# Patient Record
Sex: FEMALE | NOT HISPANIC OR LATINO | URBAN - METROPOLITAN AREA
[De-identification: names, ages, dates, MRNs, and addresses within clinical notes are randomized per-mention and may not be internally consistent; named-entity substitution may affect disease eponyms.]

---

## 2021-11-09 ENCOUNTER — OFF PREMISE (OUTPATIENT)
Dept: OTHER | Age: 36
End: 2021-11-09

## 2021-11-09 PROCEDURE — 93010 ELECTROCARDIOGRAM REPORT: CPT | Performed by: INTERNAL MEDICINE

## 2022-01-10 ENCOUNTER — OFF PREMISE (OUTPATIENT)
Dept: OTHER | Age: 37
End: 2022-01-10

## 2022-01-10 PROCEDURE — 93010 ELECTROCARDIOGRAM REPORT: CPT | Performed by: INTERNAL MEDICINE

## 2022-02-03 ENCOUNTER — EXTERNAL RECORD - RED (OUTPATIENT)
Dept: OTHER | Age: 37
End: 2022-02-03

## 2022-02-03 ENCOUNTER — OFF PREMISE (OUTPATIENT)
Dept: OTHER | Age: 37
End: 2022-02-03

## 2022-02-03 PROCEDURE — 93308 TTE F-UP OR LMTD: CPT | Performed by: INTERNAL MEDICINE

## 2022-04-25 PROCEDURE — 93010 ELECTROCARDIOGRAM REPORT: CPT | Performed by: INTERNAL MEDICINE

## 2022-10-15 PROCEDURE — 93010 ELECTROCARDIOGRAM REPORT: CPT | Performed by: INTERNAL MEDICINE

## 2022-11-01 ENCOUNTER — OFF PREMISE (OUTPATIENT)
Dept: OTHER | Age: 37
End: 2022-11-01

## 2022-11-01 PROCEDURE — 93010 ELECTROCARDIOGRAM REPORT: CPT | Performed by: INTERNAL MEDICINE

## 2023-12-06 RX ORDER — LEVETIRACETAM 500 MG/1
500 TABLET ORAL
COMMUNITY
Start: 2023-11-01

## 2023-12-06 RX ORDER — MIRTAZAPINE 15 MG/1
1 TABLET, FILM COATED ORAL NIGHTLY
COMMUNITY
Start: 2023-11-29

## 2023-12-06 RX ORDER — INSULIN GLARGINE 100 [IU]/ML
8 INJECTION, SOLUTION SUBCUTANEOUS
COMMUNITY

## 2023-12-06 RX ORDER — LIDOCAINE 50 MG/G
PATCH TOPICAL
COMMUNITY
Start: 2023-11-29

## 2023-12-06 RX ORDER — CALCITRIOL 0.25 UG/1
0.5 CAPSULE, LIQUID FILLED ORAL
COMMUNITY
Start: 2023-09-25

## 2023-12-06 RX ORDER — ERYTHROMYCIN ETHYLSUCCINATE 400 MG/5ML
200 SUSPENSION ORAL
COMMUNITY
Start: 2023-07-14

## 2023-12-06 RX ORDER — CARVEDILOL 25 MG/1
25 TABLET ORAL
COMMUNITY
Start: 2023-11-22

## 2023-12-06 RX ORDER — CLONIDINE 0.1 MG/24H
1 PATCH, EXTENDED RELEASE TRANSDERMAL
COMMUNITY
Start: 2023-09-18

## 2023-12-06 RX ORDER — NORTRIPTYLINE HYDROCHLORIDE 25 MG/1
25 CAPSULE ORAL DAILY
COMMUNITY

## 2023-12-06 RX ORDER — FOLIC ACID 1 MG/1
1 TABLET ORAL
COMMUNITY
Start: 2023-09-08

## 2023-12-06 RX ORDER — ASPIRIN 81 MG/1
81 TABLET ORAL DAILY
COMMUNITY

## 2023-12-06 RX ORDER — HYDRALAZINE HYDROCHLORIDE 25 MG/1
25 TABLET, FILM COATED ORAL
COMMUNITY

## 2023-12-06 RX ORDER — ATORVASTATIN CALCIUM 40 MG/1
40 TABLET, FILM COATED ORAL DAILY
COMMUNITY
Start: 2023-09-25

## 2023-12-06 RX ORDER — ACETAMINOPHEN, CAFFEINE, DIHYDROCODEINE BITARTRATE 320.5; 30; 16 MG/1; MG/1; MG/1
CAPSULE ORAL
COMMUNITY

## 2023-12-06 RX ORDER — BUSPIRONE HYDROCHLORIDE 5 MG/1
5 TABLET ORAL
COMMUNITY
Start: 2023-11-30

## 2023-12-06 RX ORDER — LOPERAMIDE HYDROCHLORIDE 2 MG/1
2 CAPSULE ORAL
COMMUNITY
Start: 2023-11-28

## 2023-12-06 RX ORDER — ONDANSETRON 4 MG/1
4 TABLET, ORALLY DISINTEGRATING ORAL EVERY 8 HOURS PRN
COMMUNITY
Start: 2023-11-29

## 2023-12-06 RX ORDER — HYDRALAZINE HYDROCHLORIDE 100 MG/1
100 TABLET, FILM COATED ORAL
COMMUNITY
Start: 2023-11-29

## 2023-12-06 RX ORDER — BRINZOLAMIDE/BRIMONIDINE TARTRATE 10; 2 MG/ML; MG/ML
SUSPENSION/ DROPS OPHTHALMIC
COMMUNITY
Start: 2023-08-11

## 2023-12-06 RX ORDER — ISOSORBIDE MONONITRATE 30 MG/1
30 TABLET, EXTENDED RELEASE ORAL
COMMUNITY
Start: 2023-11-29

## 2023-12-06 RX ORDER — ALPRAZOLAM 0.5 MG/1
0.5 TABLET ORAL DAILY PRN
COMMUNITY
Start: 2023-11-29

## 2023-12-06 RX ORDER — FUROSEMIDE 40 MG/1
1 TABLET ORAL DAILY
COMMUNITY
Start: 2023-08-16

## 2023-12-06 RX ORDER — ERYTHROMYCIN 5 MG/G
OINTMENT OPHTHALMIC
COMMUNITY
Start: 2023-11-08

## 2023-12-06 ASSESSMENT — COGNITIVE AND FUNCTIONAL STATUS - GENERAL
ARE YOU DEAF OR DO YOU HAVE SERIOUS DIFFICULTY  HEARING: NO
ARE YOU BLIND OR DO YOU HAVE SERIOUS DIFFICULTY SEEING, EVEN WHEN WEARING GLASSES: YES

## 2023-12-06 ASSESSMENT — ACTIVITIES OF DAILY LIVING (ADL)
CONTINENCE: NEEDS ASSISTANCE
FEEDING: NEEDS ASSISTANCE
ADL_BEFORE_ADMISSION: NEEDS/REQUIRES ASSISTANCE
TRANSFERRING: NEEDS ASSISTANCE
DRESSING: NEEDS ASSISTANCE
TOILETING: NEEDS ASSISTANCE
BATHING: NEEDS ASSISTANCE
ADL_SCORE: 6
HISTORY OF FALLING IN THE LAST YEAR (PRIOR TO ADMISSION): NO
MOBILITY_ASSIST_DEVICES: WHEELCHAIR

## 2023-12-07 ENCOUNTER — HOSPITAL ENCOUNTER (OUTPATIENT)
Age: 38
Discharge: HOME | End: 2023-12-07
Attending: OPHTHALMOLOGY | Admitting: OPHTHALMOLOGY

## 2023-12-07 ENCOUNTER — ANESTHESIA (OUTPATIENT)
Dept: SURGERY | Age: 38
End: 2023-12-07

## 2023-12-07 ENCOUNTER — ANESTHESIA EVENT (OUTPATIENT)
Dept: SURGERY | Age: 38
End: 2023-12-07

## 2023-12-07 VITALS
HEIGHT: 63 IN | TEMPERATURE: 96.8 F | BODY MASS INDEX: 33.01 KG/M2 | SYSTOLIC BLOOD PRESSURE: 144 MMHG | DIASTOLIC BLOOD PRESSURE: 63 MMHG | WEIGHT: 186.29 LBS | HEART RATE: 62 BPM | OXYGEN SATURATION: 96 % | RESPIRATION RATE: 16 BRPM

## 2023-12-07 LAB
GLUCOSE BLDC GLUCOMTR-MCNC: 73 MG/DL (ref 70–99)
POTASSIUM SERPL-SCNC: 4.3 MMOL/L (ref 3.4–5.1)

## 2023-12-07 PROCEDURE — 13000006 HB ANESTHESIA MAC S/U + 1ST 15 MIN: Performed by: OPHTHALMOLOGY

## 2023-12-07 PROCEDURE — 10002801 HB RX 250 W/O HCPCS: Performed by: ANESTHESIOLOGY

## 2023-12-07 PROCEDURE — 10002800 HB RX 250 W HCPCS: Performed by: OPHTHALMOLOGY

## 2023-12-07 PROCEDURE — 10004451 HB PACU RECOVERY 1ST 30 MINUTES: Performed by: OPHTHALMOLOGY

## 2023-12-07 PROCEDURE — 10002803 HB RX 637

## 2023-12-07 PROCEDURE — 10002801 HB RX 250 W/O HCPCS: Performed by: OPHTHALMOLOGY

## 2023-12-07 PROCEDURE — C1814 RETINAL TAMP, SILICONE OIL: HCPCS | Performed by: OPHTHALMOLOGY

## 2023-12-07 PROCEDURE — 10002801 HB RX 250 W/O HCPCS

## 2023-12-07 PROCEDURE — 10002807 HB RX 258: Performed by: ANESTHESIOLOGY

## 2023-12-07 PROCEDURE — 10006023 HB SUPPLY 272: Performed by: OPHTHALMOLOGY

## 2023-12-07 PROCEDURE — 10002803 HB RX 637: Performed by: OPHTHALMOLOGY

## 2023-12-07 PROCEDURE — 10004452 HB PACU ADDL 30 MINUTES: Performed by: OPHTHALMOLOGY

## 2023-12-07 PROCEDURE — 10002800 HB RX 250 W HCPCS: Performed by: ANESTHESIOLOGY

## 2023-12-07 PROCEDURE — 10006027 HB SUPPLY 278: Performed by: OPHTHALMOLOGY

## 2023-12-07 PROCEDURE — 13000001 HB PHASE II RECOVERY EA 30 MINUTES: Performed by: OPHTHALMOLOGY

## 2023-12-07 PROCEDURE — 13000038 HB MAJOR COMPLEX CASE S/U + 1ST 15 MIN: Performed by: OPHTHALMOLOGY

## 2023-12-07 PROCEDURE — 13000007 HB ANESTHESIA MAC EA ADD MINUTE: Performed by: OPHTHALMOLOGY

## 2023-12-07 PROCEDURE — A67113 ANES REPR RETINAL DETACHMENT; COMPLEX: Performed by: ANESTHESIOLOGY

## 2023-12-07 PROCEDURE — 82962 GLUCOSE BLOOD TEST: CPT

## 2023-12-07 PROCEDURE — 84132 ASSAY OF SERUM POTASSIUM: CPT | Performed by: ANESTHESIOLOGY

## 2023-12-07 PROCEDURE — 13000039 HB MAJOR COMPLEX CASE EA ADD MINUTE: Performed by: OPHTHALMOLOGY

## 2023-12-07 DEVICE — SILIKON™ 1000 (PURIFIED POLYDIMETHYLSILOXANE) IS HIGHLY PURIFIED LONG CHAIN POLYDIMETHYLSILOXANE TRIMETHYLSILOXY TERMINATED SILICONE OIL. IT IS STERILE,NON-PYROGENIC, CLEAR, COLORLESS AND HAS A VISCOSITY OF 1000 CS. FOR USE AS A POST-OPERATIVE RETINAL TAMPONADE DURING VITREORETINAL SURGERY. SILIKON™ 1000IS COMPOSED OF SILICON, OXYGEN, CARBON AND HYDROGEN ATOMS. SILIKON™ 1000 IS IMMISCIBLE WITH AQUEOUS COMPONENTS AND IS RELATIVELY INERT MATERIAL WITHLITTLE BIOLOGICAL TOXICITY POTENTIAL.
Type: IMPLANTABLE DEVICE | Site: EYE | Status: FUNCTIONAL
Brand: SILIKON™

## 2023-12-07 RX ORDER — SODIUM CHLORIDE, SODIUM LACTATE, POTASSIUM CHLORIDE, CALCIUM CHLORIDE 600; 310; 30; 20 MG/100ML; MG/100ML; MG/100ML; MG/100ML
INJECTION, SOLUTION INTRAVENOUS CONTINUOUS PRN
Status: DISCONTINUED | OUTPATIENT
Start: 2023-12-07 | End: 2023-12-07

## 2023-12-07 RX ORDER — BUPIVACAINE HYDROCHLORIDE 7.5 MG/ML
INJECTION, SOLUTION EPIDURAL; RETROBULBAR PRN
Status: DISCONTINUED | OUTPATIENT
Start: 2023-12-07 | End: 2023-12-07 | Stop reason: HOSPADM

## 2023-12-07 RX ORDER — OXYCODONE HYDROCHLORIDE 5 MG/1
5 TABLET ORAL
Status: DISCONTINUED | OUTPATIENT
Start: 2023-12-07 | End: 2023-12-07 | Stop reason: HOSPADM

## 2023-12-07 RX ORDER — ACETAMINOPHEN 325 MG/1
650 TABLET ORAL
Status: DISCONTINUED | OUTPATIENT
Start: 2023-12-07 | End: 2023-12-07 | Stop reason: HOSPADM

## 2023-12-07 RX ORDER — SODIUM CHLORIDE, SODIUM LACTATE, POTASSIUM CHLORIDE, CALCIUM CHLORIDE 600; 310; 30; 20 MG/100ML; MG/100ML; MG/100ML; MG/100ML
INJECTION, SOLUTION INTRAVENOUS CONTINUOUS
Status: DISCONTINUED | OUTPATIENT
Start: 2023-12-07 | End: 2023-12-07 | Stop reason: HOSPADM

## 2023-12-07 RX ORDER — DEXAMETHASONE SODIUM PHOSPHATE 4 MG/ML
INJECTION, SOLUTION INTRA-ARTICULAR; INTRALESIONAL; INTRAMUSCULAR; INTRAVENOUS; SOFT TISSUE PRN
Status: DISCONTINUED | OUTPATIENT
Start: 2023-12-07 | End: 2023-12-07 | Stop reason: HOSPADM

## 2023-12-07 RX ORDER — ACETAMINOPHEN 650 MG/1
650 SUPPOSITORY RECTAL EVERY 4 HOURS PRN
Status: DISCONTINUED | OUTPATIENT
Start: 2023-12-07 | End: 2023-12-07 | Stop reason: HOSPADM

## 2023-12-07 RX ORDER — DIPHENHYDRAMINE HYDROCHLORIDE 50 MG/ML
12.5 INJECTION INTRAMUSCULAR; INTRAVENOUS EVERY 4 HOURS PRN
Status: DISCONTINUED | OUTPATIENT
Start: 2023-12-07 | End: 2023-12-07 | Stop reason: HOSPADM

## 2023-12-07 RX ORDER — EPINEPHRINE 1 MG/ML
INJECTION, SOLUTION, CONCENTRATE INTRAVENOUS PRN
Status: DISCONTINUED | OUTPATIENT
Start: 2023-12-07 | End: 2023-12-07 | Stop reason: HOSPADM

## 2023-12-07 RX ORDER — TIMOLOL MALEATE 5 MG/ML
SOLUTION/ DROPS OPHTHALMIC PRN
Status: DISCONTINUED | OUTPATIENT
Start: 2023-12-07 | End: 2023-12-07 | Stop reason: HOSPADM

## 2023-12-07 RX ORDER — DROPERIDOL 2.5 MG/ML
0.62 INJECTION, SOLUTION INTRAMUSCULAR; INTRAVENOUS
Status: DISCONTINUED | OUTPATIENT
Start: 2023-12-07 | End: 2023-12-07 | Stop reason: HOSPADM

## 2023-12-07 RX ORDER — BRIMONIDINE TARTRATE 2 MG/ML
SOLUTION/ DROPS OPHTHALMIC PRN
Status: DISCONTINUED | OUTPATIENT
Start: 2023-12-07 | End: 2023-12-07 | Stop reason: HOSPADM

## 2023-12-07 RX ORDER — ONDANSETRON 4 MG/1
4 TABLET, ORALLY DISINTEGRATING ORAL
Status: DISCONTINUED | OUTPATIENT
Start: 2023-12-07 | End: 2023-12-07 | Stop reason: HOSPADM

## 2023-12-07 RX ORDER — PREDNISOLONE ACETATE 10 MG/ML
SUSPENSION/ DROPS OPHTHALMIC PRN
Status: DISCONTINUED | OUTPATIENT
Start: 2023-12-07 | End: 2023-12-07 | Stop reason: HOSPADM

## 2023-12-07 RX ORDER — PROPOFOL 10 MG/ML
INJECTION, EMULSION INTRAVENOUS PRN
Status: DISCONTINUED | OUTPATIENT
Start: 2023-12-07 | End: 2023-12-07

## 2023-12-07 RX ORDER — LIDOCAINE HYDROCHLORIDE 10 MG/ML
INJECTION, SOLUTION INFILTRATION; PERINEURAL PRN
Status: DISCONTINUED | OUTPATIENT
Start: 2023-12-07 | End: 2023-12-07

## 2023-12-07 RX ORDER — BALANCED SALT SOLUTION 6.4; .75; .48; .3; 3.9; 1.7 MG/ML; MG/ML; MG/ML; MG/ML; MG/ML; MG/ML
SOLUTION OPHTHALMIC PRN
Status: DISCONTINUED | OUTPATIENT
Start: 2023-12-07 | End: 2023-12-07 | Stop reason: HOSPADM

## 2023-12-07 RX ORDER — LIDOCAINE HYDROCHLORIDE 20 MG/ML
INJECTION, SOLUTION EPIDURAL; INFILTRATION; INTRACAUDAL; PERINEURAL PRN
Status: DISCONTINUED | OUTPATIENT
Start: 2023-12-07 | End: 2023-12-07 | Stop reason: HOSPADM

## 2023-12-07 RX ORDER — MOXIFLOXACIN 5 MG/ML
SOLUTION/ DROPS OPHTHALMIC PRN
Status: DISCONTINUED | OUTPATIENT
Start: 2023-12-07 | End: 2023-12-07 | Stop reason: HOSPADM

## 2023-12-07 RX ORDER — BALANCED SALT SOLUTION ENRICHED WITH BICARBONATE, DEXTROSE, AND GLUTATHIONE
KIT INTRAOCULAR PRN
Status: DISCONTINUED | OUTPATIENT
Start: 2023-12-07 | End: 2023-12-07 | Stop reason: HOSPADM

## 2023-12-07 RX ORDER — ACETAMINOPHEN 325 MG/1
650 TABLET ORAL EVERY 4 HOURS PRN
Status: DISCONTINUED | OUTPATIENT
Start: 2023-12-07 | End: 2023-12-07 | Stop reason: HOSPADM

## 2023-12-07 RX ORDER — LORAZEPAM 2 MG/ML
1 INJECTION INTRAMUSCULAR
Status: DISCONTINUED | OUTPATIENT
Start: 2023-12-07 | End: 2023-12-07 | Stop reason: HOSPADM

## 2023-12-07 RX ORDER — MIDAZOLAM HYDROCHLORIDE 1 MG/ML
INJECTION, SOLUTION INTRAMUSCULAR; INTRAVENOUS PRN
Status: DISCONTINUED | OUTPATIENT
Start: 2023-12-07 | End: 2023-12-07

## 2023-12-07 RX ORDER — HYDRALAZINE HYDROCHLORIDE 20 MG/ML
5 INJECTION INTRAMUSCULAR; INTRAVENOUS EVERY 10 MIN PRN
Status: DISCONTINUED | OUTPATIENT
Start: 2023-12-07 | End: 2023-12-07 | Stop reason: HOSPADM

## 2023-12-07 RX ORDER — ONDANSETRON 2 MG/ML
4 INJECTION INTRAMUSCULAR; INTRAVENOUS 2 TIMES DAILY PRN
Status: DISCONTINUED | OUTPATIENT
Start: 2023-12-07 | End: 2023-12-07 | Stop reason: HOSPADM

## 2023-12-07 RX ORDER — DORZOLAMIDE HCL 20 MG/ML
SOLUTION/ DROPS OPHTHALMIC PRN
Status: DISCONTINUED | OUTPATIENT
Start: 2023-12-07 | End: 2023-12-07 | Stop reason: HOSPADM

## 2023-12-07 RX ORDER — ERYTHROMYCIN 5 MG/G
OINTMENT OPHTHALMIC PRN
Status: DISCONTINUED | OUTPATIENT
Start: 2023-12-07 | End: 2023-12-07 | Stop reason: HOSPADM

## 2023-12-07 RX ADMIN — MIDAZOLAM HYDROCHLORIDE 2 MG: 1 INJECTION, SOLUTION INTRAMUSCULAR; INTRAVENOUS at 16:41

## 2023-12-07 RX ADMIN — SODIUM CHLORIDE, POTASSIUM CHLORIDE, SODIUM LACTATE AND CALCIUM CHLORIDE: 600; 310; 30; 20 INJECTION, SOLUTION INTRAVENOUS at 16:39

## 2023-12-07 RX ADMIN — PROPOFOL 50 MG: 10 INJECTION, EMULSION INTRAVENOUS at 16:50

## 2023-12-07 RX ADMIN — PROPOFOL 20 MG: 10 INJECTION, EMULSION INTRAVENOUS at 17:02

## 2023-12-07 RX ADMIN — LIDOCAINE HYDROCHLORIDE 3 ML: 10 INJECTION, SOLUTION INFILTRATION; PERINEURAL at 16:50

## 2023-12-07 RX ADMIN — FENTANYL CITRATE 25 MCG: 50 INJECTION INTRAMUSCULAR; INTRAVENOUS at 16:57

## 2023-12-07 ASSESSMENT — PAIN SCALES - GENERAL
PAINLEVEL_OUTOF10: 0

## 2023-12-07 ASSESSMENT — ENCOUNTER SYMPTOMS: SEIZURES: 1

## 2024-09-06 NOTE — PROGRESS NOTES
[facility] Urology  Initial Office Consultation    HPI:   Adelaide Terry is a 39 year old female with extensive PMHx, including heart failure, dialysis, kidney failure, blindness, depression, fibromyalgia, CVA, anxiety, DM, Parkinson's, seizures, and recurrent UTI, here today for urinary retention and UTI.    The patient states she has had multiple UTIs this year. Is not currently symptomatic. Was admitted earlier this month with AMS secondary to increased opiate dose combined with THC gummy. Urine was dirty at the time and she was placed on antibiotics. Does CIC every other day currently, as they were getting <100 mL every other day. The catheters are prescribed by palliative care.     History reviewed. No pertinent past medical history.  History reviewed. No pertinent surgical history.  History reviewed. No pertinent family history.  Social History     Socioeconomic History    Marital status:      Social Determinants of Health     Financial Resource Strain: Low Risk  (11/3/2020)    Received from [facility]    Overall Financial Resource Strain (CARDIA)     Difficulty of Paying Living Expenses: Not hard at all   Food Insecurity: Low Risk  (8/7/2024)    Received from [facility]     Within the past 12 months, the food you bought just didn't last and you didn't have money to get more.: 3     Within the past 12 months, you worried that your food would run out before you got money to buy more.: 3   Transportation Needs: Not At Risk (8/7/2024)    Received from [facility] Transportation Needs     In the past 12 months, has lack of reliable transportation kept you from medical appointments, meetings, work or from getting things needed for daily living?: No   Social Connections: Feeling Socially Integrated (6/4/2024)    [facility]    OASIS : Social Isolation     Frequency of experiencing loneliness or isolation: Rarely   Housing Stability:  Not At Risk (8/7/2024)    Received from [facility]     What is your living situation today?: I have a steady place to live     Think about the place you live. Do you have problems with any of the following?: None of the above     No current outpatient medications on file.       Allergies: Patient has no allergy information on record.    REVIEW OF SYSTEMS:  Review of Systems   Eyes:         Blind   Gastrointestinal:  Positive for diarrhea.   Genitourinary:  Positive for difficulty urinating. Negative for dysuria and urgency.   All other systems reviewed and are negative.        EXAM:  There were no vitals taken for this visit.    Physical Exam  Constitutional:       General: She is not in acute distress.  HENT:      Head: Normocephalic and atraumatic.      Mouth/Throat:      Mouth: Mucous membranes are moist.   Pulmonary:      Effort: Pulmonary effort is normal.   Abdominal:      General: Abdomen is flat.      Palpations: Abdomen is soft.   Musculoskeletal:      Comments: In wheelchair   Skin:     General: Skin is warm and dry.   Neurological:      Mental Status: She is alert and oriented to person, place, and time.   Psychiatric:         Mood and Affect: Mood normal.         Behavior: Behavior normal.         Thought Content: Thought content normal.         Judgment: Judgment normal.          LABS:  No results found for: \"PSA\", \"QPSA\", \"TOTPSASCREEN\"  Lab Results   Component Value Date    WBC 7.4 06/29/2021    RBC 3.57 (L) 06/29/2021    HGB 9.4 (L) 06/29/2021    HCT 29.6 (L) 06/29/2021    MCV 82.9 06/29/2021    MCH 26.3 06/29/2021    MCHC 31.8 06/29/2021    RDW 13.7 06/29/2021    .0 06/29/2021     Lab Results   Component Value Date    GLU 93 06/29/2021    BUN 18 06/29/2021    BUNCREA 4.4 (L) 06/29/2021    CREATSERUM 4.05 (H) 06/29/2021    ANIONGAP 10 06/29/2021    GFRNAA 13 (L) 06/29/2021    GFRAA 15 (L) 06/29/2021    CA 8.7 06/29/2021     06/29/2021    K 4.1 06/29/2021      06/29/2021    CO2 27.0 06/29/2021     No results found for: \"PTP\", \"PT\", \"INR\"    IMAGING:  No results found.    IMPRESSION:  Recurrent UTI  Urinary Retention    PLAN:  - Follow bladder behaviors for UTI prevention  - Continue CIC every day indefinitely  - Follow-up as needed    Yamilka Johnson, HUY  9/10/2024

## 2024-09-10 ENCOUNTER — OFFICE VISIT (OUTPATIENT)
Dept: SURGERY | Facility: CLINIC | Age: 39
End: 2024-09-10

## 2024-09-10 DIAGNOSIS — R33.9 URINARY RETENTION: ICD-10-CM

## 2024-09-10 DIAGNOSIS — N39.0 RECURRENT UTI: Primary | ICD-10-CM

## 2024-09-10 PROCEDURE — 99205 OFFICE O/P NEW HI 60 MIN: CPT

## 2024-09-10 NOTE — PATIENT INSTRUCTIONS
UTI prevention with continuing good hydration, starting a women's probiotic (bottle should say women's, vaginal, genitourinary; main ingredient should be lactobacillus), Cranberry pills (Ellura, Utiva, Crancap -all are found on [employer] on their respective website; they should have 36 mg PAC), bowel regimen (colace, senna, miralax), voiding before and after sexual activity and using pH balanced soaps.

## 2024-12-30 ENCOUNTER — HOSPITAL ENCOUNTER (EMERGENCY)
Age: 39
Discharge: LEFT BEFORE DISCHARGE | End: 2024-12-30

## (undated) DEVICE — SOLUTION VSCELAS 1ML 2% HYDROXYPROPYL METHYLCELLULOSE

## (undated) DEVICE — PROBE HMERSR 150D TPR 25GA BP HLW CORE TRGT COAG FINE TIP

## (undated) DEVICE — PACK VITRTM 20GA CNSTL PROBE FRAGMENTATION HNDPC LTWT LF

## (undated) DEVICE — SUTURE MICROPOINT 6-0 TG140-8 18IN PLN 2 ARM MONO ABS 1735G

## (undated) DEVICE — FORCEPS SURG GRSHBR REV 25GA SRR TIP HI QUAL RTU VR LTWT

## (undated) DEVICE — Device

## (undated) DEVICE — PROBE HMERSR STRGT 18GA BP HLW CORE TRGT COAG BVL TIP WTFLD

## (undated) DEVICE — DRAPE ADH OVAL APRTR FILM 51X31IN 2 78IN 2IN MED 4 78INX3

## (undated) DEVICE — SHIELD OPHTHALMIC THIN SOFT SHLD 12 HR CLGN 14.5MM CRNL

## (undated) DEVICE — TOWEL OR BLU 16X26IN 4PK

## (undated) DEVICE — HOLDER TUBE REMOVABLE TCH FASTEN STRAP DEVON STRL LF DISP

## (undated) DEVICE — RETRACTOR GRSHBR IRIS CORNEA FLXB HOOK SMTH FNSH OPHTHALMIC

## (undated) DEVICE — FORCEPS OPHTHALMIC 25GA GRSHBR REV 360D MIC RTNL STRL DISP

## (undated) DEVICE — GLOVE SURG 7.5 PROTEXIS PI LF CRM PF BEAD CUFF STRL PLISPRN

## (undated) DEVICE — KNIFE OPHTHALMIC 1.4MM 20GA A-OK 5-LANCE STRGT CRNL SCLR

## (undated) DEVICE — SOLUTION IO 15ML BSS NONANTIBIOTIC IRR NA K CA MG

## (undated) DEVICE — BANDAGE GAUZE BLK2 4.1YDX4.5IN 6 PLY OPEN WEAVE TIGHT FNSH

## (undated) DEVICE — CABLE BP 12FT FTSWTCH CAUT STRL DISP

## (undated) DEVICE — PACK VITRTM 25+ GA CNSTL 10000 CPM STRL LF DISP

## (undated) DEVICE — PROBE LSR 25GA ENGAUGE VEKTOR ARTICULATE ILLUMINATE STRL LF

## (undated) DEVICE — PACK VITRTM 20GA 23GA CNSTL ENGAUGE RBR SYRINGE NDL CANNULA

## (undated) DEVICE — SUTURE VICRYL MICROPOINT 7-0 TG140-8 18IN 2 ARM BRAID COAT J546G

## (undated) DEVICE — SCRAPER OPHTHALMIC 25GA CRV

## (undated) DEVICE — GLOVE SURG 6.5 PROTEXIS PI LF CRM PF BEAD CUFF STRL PLISPRN

## (undated) DEVICE — CANNULA OPHTHALMIC 25GA .8MM SHORT SFTP GRSHBR STRL DISP